# Patient Record
Sex: FEMALE | Race: WHITE | URBAN - METROPOLITAN AREA
[De-identification: names, ages, dates, MRNs, and addresses within clinical notes are randomized per-mention and may not be internally consistent; named-entity substitution may affect disease eponyms.]

---

## 2021-08-04 ENCOUNTER — OFFICE VISIT (OUTPATIENT)
Dept: URBAN - METROPOLITAN AREA CLINIC 44 | Facility: CLINIC | Age: 61
End: 2021-08-04
Payer: COMMERCIAL

## 2021-08-04 DIAGNOSIS — H43.21 CRYSTALLINE DEPOSITS IN VITREOUS BODY, RIGHT EYE: ICD-10-CM

## 2021-08-04 DIAGNOSIS — H18.593 OTHER HEREDITARY CORNEAL DYSTROPHIES, BILATERAL: ICD-10-CM

## 2021-08-04 DIAGNOSIS — H04.123 DRY EYE SYNDROME OF BILATERAL LACRIMAL GLANDS: ICD-10-CM

## 2021-08-04 PROCEDURE — 92134 CPTRZ OPH DX IMG PST SGM RTA: CPT | Performed by: OPHTHALMOLOGY

## 2021-08-04 PROCEDURE — 99204 OFFICE O/P NEW MOD 45 MIN: CPT | Performed by: OPHTHALMOLOGY

## 2021-08-04 PROCEDURE — 92025 CPTRIZED CORNEAL TOPOGRAPHY: CPT | Performed by: OPHTHALMOLOGY

## 2021-08-04 PROCEDURE — 76514 ECHO EXAM OF EYE THICKNESS: CPT | Performed by: OPHTHALMOLOGY

## 2021-08-04 ASSESSMENT — VISUAL ACUITY
OD: 20/40
OS: 20/50

## 2021-08-04 ASSESSMENT — INTRAOCULAR PRESSURE
OS: 14
OD: 13

## 2021-08-04 NOTE — IMPRESSION/PLAN
Impression: Dry eye syndrome of bilateral lacrimal glands: H04.123. Plan: Dry eyes account for the patient's complaints. There is no evidence of permanent changes to the cornea. Recommend Artificial Tear drops  BID to QID and Artificial Tear gel  QHS OU and environment changes. Patient expressed understanding.

## 2021-08-04 NOTE — IMPRESSION/PLAN
Impression: Combined forms of age-related cataract, bilateral: H25.813. Plan: Discussed cataract diagnosis with the patient. Risks and benefits of surgical treatment were discussed and understood. Patient elects surgical treatment. Patient desires surgery OU, plan for Phaco-DMEK OU, OS first.  Standard IOLs OU,  AIM -0.25 to 0.75D,   NO LENSX, NO ORA, NO AMP. Patient understands the need for glasses after surgery for BCVA.  No injectables- drops only

## 2021-08-04 NOTE — IMPRESSION/PLAN
Impression: Endothelial corneal dystrophy, bilateral: H18.513.
- h/o Lupus and sciatica Plan: Increased CCTs with descemet's scarring, I would recommend DMEK surgery, a version of endothelial keratoplasty replacing endothelial cells with descemet's membrane. This is a newer procedure but appears as though the rejection rate with this procedure is lower when compared to previous corneal transplants. The visual recovery is faster but may still take weeks to months. There are more restrictions on head positioning in the post op period and no travel at higher altitudes is permitted for one week. There is a higher chance of needing air placed in the office again for better adherence. Risks of DMEK include similar risks to any intraocular surgery such as bleeding and infection. Risks include rejection, need for additional surgery, need for glasses after, and the risks from the medications used. Steroids should not be stopped without instruction by a doctor. Questions answered. Schedule phaco DMEK OU, OS first 5-6 weeks apart,  7.75 mm. Will plan for AK OS at the time of phaco-DMEK OS Discussed supine positioning with DMEK given sciatica, pt understands and states she will be able to comply.

## 2021-08-04 NOTE — IMPRESSION/PLAN
Impression: Other hereditary corneal dystrophies, bilateral: H18.593. Plan: Visually significant. Recommend (anterior keratectomy) AK  to help smooth surface. Surface irregularity contributing to decreased vision through causing additional astigmatism. Recommend smoothening procedure with to remove irregularity. This is a treatment for the symptom, not a cure for the cause of the problem and recurrence can happen. Dry eye care stressed to patient. Surface can take 1-4 weeks to heal and irregularities can take 1-3 months to stabilize. Pt understands that vision can initially be worse secondary to treatment. Patient understands that treatment isn't urgent and is their choice. Risks include infection and recurrence. A contact lens (hard or soft) may be needed for best vision. The change in refraction is difficult to predict. RBA reviewed including infection, irregular astigmatism and delayed healing. Patient elects treatment.  Schedule AK *OS only at time of phaco-DMEK

## 2021-09-03 ENCOUNTER — ADULT PHYSICAL (OUTPATIENT)
Dept: URBAN - METROPOLITAN AREA CLINIC 44 | Facility: CLINIC | Age: 61
End: 2021-09-03
Payer: COMMERCIAL

## 2021-09-03 DIAGNOSIS — Z01.818 ENCOUNTER FOR OTHER PREPROCEDURAL EXAMINATION: Primary | ICD-10-CM

## 2021-09-03 DIAGNOSIS — H25.813 COMBINED FORMS OF AGE-RELATED CATARACT, BILATERAL: ICD-10-CM

## 2021-09-03 PROCEDURE — 99203 OFFICE O/P NEW LOW 30 MIN: CPT | Performed by: PHYSICIAN ASSISTANT

## 2021-09-03 PROCEDURE — 76519 ECHO EXAM OF EYE: CPT | Performed by: OPHTHALMOLOGY

## 2021-09-20 ENCOUNTER — SURGERY (OUTPATIENT)
Dept: URBAN - METROPOLITAN AREA SURGERY 19 | Facility: SURGERY | Age: 61
End: 2021-09-20
Payer: COMMERCIAL

## 2021-09-20 PROCEDURE — 65756 CORNEAL TRNSPL ENDOTHELIAL: CPT | Performed by: OPHTHALMOLOGY

## 2021-09-20 RX ORDER — ACETAMINOPHEN AND CODEINE PHOSPHATE 300; 30 MG/1; MG/1
TABLET ORAL
Qty: 10 | Refills: 0 | Status: ACTIVE
Start: 2021-09-20

## 2021-09-21 ENCOUNTER — POST-OPERATIVE VISIT (OUTPATIENT)
Dept: URBAN - METROPOLITAN AREA CLINIC 44 | Facility: CLINIC | Age: 61
End: 2021-09-21
Payer: COMMERCIAL

## 2021-09-21 PROCEDURE — 99024 POSTOP FOLLOW-UP VISIT: CPT | Performed by: OPTOMETRIST

## 2021-09-21 ASSESSMENT — INTRAOCULAR PRESSURE: OS: 21

## 2021-09-21 NOTE — IMPRESSION/PLAN
Impression: S/P DMEK (Descemet Membrane Endothelial Keratoplasty); Cataract Extraction by phacoemulsification with IOL placement; Anterior Keratotomy OS - 1 Day. Encounter for surgical aftercare following surgery on a sense organ  Z48.810. Plan: S/p CE, AK, and DMEK OS. AK healing well, patient comfortable. Applied 8.4 AO BCL + 1 gtt ofloxacin. DMEK graft attached. Use drops as prescribed.

## 2021-09-28 ENCOUNTER — POST-OPERATIVE VISIT (OUTPATIENT)
Dept: URBAN - METROPOLITAN AREA CLINIC 44 | Facility: CLINIC | Age: 61
End: 2021-09-28
Payer: COMMERCIAL

## 2021-09-28 DIAGNOSIS — Z94.7 CORNEAL TRANSPLANT STATUS: Primary | ICD-10-CM

## 2021-09-28 PROCEDURE — 99024 POSTOP FOLLOW-UP VISIT: CPT | Performed by: OPHTHALMOLOGY

## 2021-09-28 PROCEDURE — 92071 CONTACT LENS FITTING FOR TX: CPT | Performed by: OPHTHALMOLOGY

## 2021-09-28 ASSESSMENT — INTRAOCULAR PRESSURE
OS: 16
OD: 15

## 2021-09-28 NOTE — IMPRESSION/PLAN
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
- s/p phaco-DEMK+AK OS 9/20/21 Plan: POW#1, doing well, Graft attached except for small area, will CTM for now and observe for reattachment. IOP adequate. BCL removed and replaced 8.6.   Precautions reviewed, Cont Pred QID, ketorolac weekly taper, cont Oflox QID

## 2021-10-06 ENCOUNTER — POST-OPERATIVE VISIT (OUTPATIENT)
Dept: URBAN - METROPOLITAN AREA CLINIC 10 | Facility: CLINIC | Age: 61
End: 2021-10-06
Payer: COMMERCIAL

## 2021-10-06 PROCEDURE — 92071 CONTACT LENS FITTING FOR TX: CPT | Performed by: OPHTHALMOLOGY

## 2021-10-06 PROCEDURE — 99024 POSTOP FOLLOW-UP VISIT: CPT | Performed by: OPHTHALMOLOGY

## 2021-10-06 NOTE — IMPRESSION/PLAN
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
- s/p phaco-DEMK+AK OS 9/20/21 Plan: POW#1, doing well, Graft attached except for small area, will CTM for now and observe for reattachment. IOP adequate. BCL removed and replaced 8.6.   Precautions reviewed, Cont Pred QID, ketorolac weekly taper, cont Oflox QID until BCL removed

## 2021-10-15 ENCOUNTER — ADULT PHYSICAL (OUTPATIENT)
Dept: URBAN - METROPOLITAN AREA CLINIC 44 | Facility: CLINIC | Age: 61
End: 2021-10-15
Payer: COMMERCIAL

## 2021-10-15 PROCEDURE — 99213 OFFICE O/P EST LOW 20 MIN: CPT | Performed by: PHYSICIAN ASSISTANT

## 2021-10-15 PROCEDURE — 99024 POSTOP FOLLOW-UP VISIT: CPT | Performed by: OPTOMETRIST

## 2021-10-15 RX ORDER — PREDNISOLONE ACETATE 10 MG/ML
1 % SUSPENSION/ DROPS OPHTHALMIC
Qty: 5 | Refills: 3 | Status: INACTIVE
Start: 2021-10-15 | End: 2021-10-15

## 2021-10-15 NOTE — IMPRESSION/PLAN
Impression: S/P DMEK (Descemet Membrane Endothelial Keratoplasty); Cataract Extraction by phacoemulsification with IOL placement; Anterior Keratotomy OS - 25 Days. Encounter for surgical aftercare following surgery on a sense organ  Z48.810.  Plan: d/c bscl , start aft q1h and decrease  all 3 gtts tid, keep f/u in 5 days

## 2021-10-21 NOTE — IMPRESSION/PLAN
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
- s/p phaco-DEMK+AK OS 9/20/21 Plan: POW#4, doing well, graft C/C. Continue PF QID until _11_/_20_/_21_, then decrease PF to TID OD, d/c Ketorolac. ATs prn. Precautions reviewed. Will need yag caps in the future. Proceed with surgery OD as scheduled.

## 2021-11-01 ENCOUNTER — SURGERY (OUTPATIENT)
Dept: URBAN - METROPOLITAN AREA SURGERY 19 | Facility: SURGERY | Age: 61
End: 2021-11-01
Payer: COMMERCIAL

## 2021-11-01 ENCOUNTER — POST-OPERATIVE VISIT (OUTPATIENT)
Dept: URBAN - METROPOLITAN AREA CLINIC 10 | Facility: CLINIC | Age: 61
End: 2021-11-01
Payer: COMMERCIAL

## 2021-11-01 PROCEDURE — 65756 CORNEAL TRNSPL ENDOTHELIAL: CPT | Performed by: OPHTHALMOLOGY

## 2021-11-01 PROCEDURE — 99024 POSTOP FOLLOW-UP VISIT: CPT | Performed by: OPHTHALMOLOGY

## 2021-11-01 ASSESSMENT — VISUAL ACUITY: OS: 20/40

## 2021-11-02 ENCOUNTER — POST-OPERATIVE VISIT (OUTPATIENT)
Dept: URBAN - METROPOLITAN AREA CLINIC 44 | Facility: CLINIC | Age: 61
End: 2021-11-02

## 2021-11-02 DIAGNOSIS — Z48.810 ENCOUNTER FOR SURGICAL AFTERCARE FOLLOWING SURGERY ON A SENSE ORGAN: Primary | ICD-10-CM

## 2021-11-02 PROCEDURE — 99024 POSTOP FOLLOW-UP VISIT: CPT | Performed by: OPTOMETRIST

## 2021-11-02 ASSESSMENT — INTRAOCULAR PRESSURE
OD: 19
OD: 24

## 2021-11-02 NOTE — IMPRESSION/PLAN
Impression: S/P Cataract Extraction by phacoemulsification with IOL placement; DMEK (Descemet Membrane Endothelial Keratoplasty); Periphereal Iridectomy OD - 1 Day. Encounter for surgical aftercare following surgery on a sense organ  Z48.810. Plan: DMEK graft attached 360. IOP okay. Use drops as prescribed. Patient will call [employer] to get disability paperwork filled out. Patient also inquiring about YAG OS -- patient will discuss with Dr. Quinn Rivas next week.

## 2021-11-09 ENCOUNTER — POST-OPERATIVE VISIT (OUTPATIENT)
Dept: URBAN - METROPOLITAN AREA CLINIC 44 | Facility: CLINIC | Age: 61
End: 2021-11-09
Payer: COMMERCIAL

## 2021-11-09 DIAGNOSIS — H26.492 OTHER SECONDARY CATARACT, LEFT EYE: ICD-10-CM

## 2021-11-09 PROCEDURE — 99024 POSTOP FOLLOW-UP VISIT: CPT | Performed by: OPHTHALMOLOGY

## 2021-11-09 ASSESSMENT — INTRAOCULAR PRESSURE
OD: 10
OS: 9

## 2021-11-09 NOTE — IMPRESSION/PLAN
Impression: Other secondary cataract, left eye: H26.492. Plan: Opacified capsule with option for YAG laser capsulotomy. Discussed procedure, risks, benefits and side effects. Patient agrees to YAG laser capsulotomy.  Schedule YAG laser OS 12/21/21

## 2021-11-09 NOTE — IMPRESSION/PLAN
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
- s/p phaco-DMEK+AK OS 9/20/21, phaco-DMEK OD 11/1/21 Plan: POW#1 OD, doing well, Graft attached 360. IOP adequate. Precautions reviewed, Cont Pred and ATs QID, d/c Oflox. Ketorolac TID taper weekly. POW#6, doing well, graft C/C. Continue PF QID until _11_/_20_/_21_, then decrease PF to TID OD until 12/20/21  ATs prn. Precautions reviewed.

## 2021-12-08 ENCOUNTER — OFFICE VISIT (OUTPATIENT)
Dept: URBAN - METROPOLITAN AREA CLINIC 44 | Facility: CLINIC | Age: 61
End: 2021-12-08
Payer: COMMERCIAL

## 2021-12-08 PROCEDURE — 99024 POSTOP FOLLOW-UP VISIT: CPT | Performed by: OPHTHALMOLOGY

## 2021-12-08 ASSESSMENT — INTRAOCULAR PRESSURE
OD: 19
OS: 20

## 2021-12-08 NOTE — IMPRESSION/PLAN
Impression: Other secondary cataract, left eye: H26.492. Plan: Opacified capsule with option for YAG laser capsulotomy. Discussed procedure, risks, benefits and side effects. Patient agrees to YAG laser capsulotomy. Schedule YAG laser OS 1/3/22.   Will perform bedside H&P.

## 2021-12-08 NOTE — IMPRESSION/PLAN
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
- s/p phaco-DMEK+AK OS 9/20/21, phaco-DMEK OD 11/1/21 Plan: POM#1 OD, doing well, Cont Pred QID. d/c Ketorolac. POM#3.5, doing well, graft C/C. Continue PF TID OD until 12/20/21 then decrease to BID until 1/20/22 then decrease to qd. ATs prn. Precautions reviewed.

## 2022-01-03 ENCOUNTER — SURGERY (OUTPATIENT)
Dept: URBAN - METROPOLITAN AREA SURGERY 5 | Facility: SURGERY | Age: 62
End: 2022-01-03
Payer: COMMERCIAL

## 2022-01-03 PROCEDURE — 66821 AFTER CATARACT LASER SURGERY: CPT | Performed by: OPHTHALMOLOGY

## 2022-03-01 ENCOUNTER — OFFICE VISIT (OUTPATIENT)
Dept: URBAN - METROPOLITAN AREA CLINIC 44 | Facility: CLINIC | Age: 62
End: 2022-03-01
Payer: COMMERCIAL

## 2022-03-01 DIAGNOSIS — H18.513 ENDOTHELIAL CORNEAL DYSTROPHY, BILATERAL: ICD-10-CM

## 2022-03-01 PROCEDURE — 99213 OFFICE O/P EST LOW 20 MIN: CPT | Performed by: OPHTHALMOLOGY

## 2022-03-01 ASSESSMENT — INTRAOCULAR PRESSURE
OS: 21
OD: 17

## 2022-03-01 NOTE — IMPRESSION/PLAN
Impression: Endothelial corneal dystrophy, bilateral: H18.513.
- s/p phaco-DMEK+AK OS 9/20/21, phaco-DMEK OD 11/1/21 Plan: POM#4 OD, doing well, cont pred qd until 11/1/22 then d/c. POM#5.5 OS, doing well, cont pred qd until 9/20/21 then d/c. Precautions reviewed. 
May return to [facility] for continued care, cornea prn

## 2023-02-08 ENCOUNTER — OFFICE VISIT (OUTPATIENT)
Dept: URBAN - METROPOLITAN AREA CLINIC 10 | Facility: CLINIC | Age: 63
End: 2023-02-08
Payer: COMMERCIAL

## 2023-02-08 DIAGNOSIS — H43.391 OTHER VITREOUS OPACITIES, RIGHT EYE: ICD-10-CM

## 2023-02-08 DIAGNOSIS — H18.513 ENDOTHELIAL CORNEAL DYSTROPHY, BILATERAL: Primary | ICD-10-CM

## 2023-02-08 DIAGNOSIS — Z96.1 PRESENCE OF PSEUDOPHAKIA: ICD-10-CM

## 2023-02-08 PROCEDURE — 99214 OFFICE O/P EST MOD 30 MIN: CPT | Performed by: OPHTHALMOLOGY

## 2023-02-08 ASSESSMENT — INTRAOCULAR PRESSURE
OD: 12
OS: 14

## 2023-02-08 NOTE — IMPRESSION/PLAN
Impression: Endothelial corneal dystrophy, bilateral: H18.513.
- s/p phaco-DMEK+AK OS 9/20/21, phaco-DMEK OD 11/1/21 Plan: grafts clear, off pred. 
Cornea prn

## 2023-02-08 NOTE — IMPRESSION/PLAN
Impression: Other vitreous opacities, right eye: H43.391. Plan: Significant asteroid, pt c/o blurry vision. 
Will refer to retina to determine if vitrectomy is indicated